# Patient Record
Sex: FEMALE | URBAN - METROPOLITAN AREA
[De-identification: names, ages, dates, MRNs, and addresses within clinical notes are randomized per-mention and may not be internally consistent; named-entity substitution may affect disease eponyms.]

---

## 2024-03-21 ENCOUNTER — APPOINTMENT (RX ONLY)
Dept: URBAN - METROPOLITAN AREA CLINIC 60 | Facility: CLINIC | Age: 39
Setting detail: DERMATOLOGY
End: 2024-03-21

## 2024-03-21 DIAGNOSIS — D17 BENIGN LIPOMATOUS NEOPLASM: ICD-10-CM

## 2024-03-21 PROBLEM — D23.39 OTHER BENIGN NEOPLASM OF SKIN OF OTHER PARTS OF FACE: Status: ACTIVE | Noted: 2024-03-21

## 2024-03-21 PROBLEM — D17.23 BENIGN LIPOMATOUS NEOPLASM OF SKIN AND SUBCUTANEOUS TISSUE OF RIGHT LEG: Status: ACTIVE | Noted: 2024-03-21

## 2024-03-21 PROCEDURE — ? DEFER

## 2024-03-21 PROCEDURE — 99202 OFFICE O/P NEW SF 15 MIN: CPT

## 2024-03-21 PROCEDURE — ? COUNSELING

## 2024-03-21 ASSESSMENT — LOCATION ZONE DERM: LOCATION ZONE: LEG

## 2024-03-21 ASSESSMENT — LOCATION DETAILED DESCRIPTION DERM: LOCATION DETAILED: RIGHT ANTERIOR PROXIMAL THIGH

## 2024-03-21 ASSESSMENT — LOCATION SIMPLE DESCRIPTION DERM: LOCATION SIMPLE: RIGHT THIGH

## 2024-03-21 NOTE — PROCEDURE: DEFER
Detail Level: Detailed
Introduction Text (Please End With A Colon): The following procedure was deferred:
Reason To Defer Override: Refer to general surgery.
Size Of Lesion In Cm (Optional): 1.5
X Size Of Lesion In Cm (Optional): 0